# Patient Record
Sex: MALE | Race: WHITE
[De-identification: names, ages, dates, MRNs, and addresses within clinical notes are randomized per-mention and may not be internally consistent; named-entity substitution may affect disease eponyms.]

---

## 2018-10-06 ENCOUNTER — HOSPITAL ENCOUNTER (INPATIENT)
Dept: HOSPITAL 80 - FED | Age: 43
LOS: 3 days | Discharge: HOME | DRG: 287 | End: 2018-10-09
Attending: INTERNAL MEDICINE | Admitting: INTERNAL MEDICINE
Payer: COMMERCIAL

## 2018-10-06 DIAGNOSIS — E86.9: ICD-10-CM

## 2018-10-06 DIAGNOSIS — I51.4: Primary | ICD-10-CM

## 2018-10-06 LAB
INR PPP: 1.08 (ref 0.83–1.16)
PLATELET # BLD: 270 10^3/UL (ref 150–400)
PROTHROMBIN TIME: 14.2 SEC (ref 12–15)

## 2018-10-06 PROCEDURE — B2151ZZ FLUOROSCOPY OF LEFT HEART USING LOW OSMOLAR CONTRAST: ICD-10-PCS | Performed by: INTERNAL MEDICINE

## 2018-10-06 PROCEDURE — B2111ZZ FLUOROSCOPY OF MULTIPLE CORONARY ARTERIES USING LOW OSMOLAR CONTRAST: ICD-10-PCS | Performed by: INTERNAL MEDICINE

## 2018-10-06 PROCEDURE — A9585 GADOBUTROL INJECTION: HCPCS

## 2018-10-06 PROCEDURE — C1760 CLOSURE DEV, VASC: HCPCS

## 2018-10-06 RX ADMIN — IBUPROFEN SCH MG: 600 TABLET ORAL at 20:51

## 2018-10-06 RX ADMIN — NITROGLYCERIN PRN MG: 0.4 TABLET SUBLINGUAL at 18:28

## 2018-10-06 RX ADMIN — NITROGLYCERIN PRN MG: 0.4 TABLET SUBLINGUAL at 18:34

## 2018-10-06 NOTE — EDPHY
H & P


Smoking Status: Never smoked


Time Seen by Provider: 10/06/18 18:03


HPI/ROS: 


CHIEF COMPLAINT:  Chest pressure





HISTORY OF PRESENT ILLNESS:  The patient is a 43-year-old male here with chief 

complaint of chest pressure since 2 o'clock this morning.  He reports chest 

pressure that woke him from his sleep around 2:00 a.m and lasted until around 10

:00 a.m.  States the pain self-resolved and then reoccurred around 4 o'clock 

this afternoon.  The chest pain does not radiate.  He has had no diaphoresis.  

He was picking up his daughter from school and driving when the pain 

reoccurred.  He has had no exertional chest pain recently.  He has no cardiac 

history.  He has no history of diabetes, hypertension, dyslipidemia, smoking.  

He has not taken any aspirin today.  Denies any drug allergies.  He has never 

had a stress test.  States he has felt slightly fatigued for the last 5 days 

has had a"bad workouts"and has noticed that his heart rate has been elevated is 

typically slow.  Has no history of DVT chest pain.  He has had no cough.  

States he woke this morning and had a temperature of 101 degrees.  He's had no 

runny nose or sore throat.





REVIEW OF SYSTEMS:


Constitutional:  + fever, no chills.


Eyes:  No discharge.


ENT:  No sore throat.


Cardiovascular:  + chest pain, no palpitations.


Respiratory:  No cough, no shortness of breath.


Gastrointestinal:  No abdominal pain, no vomiting.


Genitourinary:  No hematuria.


Musculoskeletal:  No back pain.


Skin:  No rashes.


Neurological:  No headache. (Medardo Wall)


Physical Exam: 





General Appearance:  Alert and no distress.  No respiratory distress.  Non 

diaphoretic.


Eyes:  Pupils equal and round no injection.


Respiratory:  Chest is nontender, lungs are clear to auscultation.  No 

respiratory distress


Cardiac:  regular rate and rhythm.  No murmur or rub.


Gastrointestinal:  Abdomen is soft and nontender, no masses, bowel sounds 

normal.


Musculoskeletal:  Neck is supple and nontender.  No JVD


Extremities have full range of motion and are nontender.


Skin:  No rashes or lesions.


Neuro:  Alert and oriented


 (Medardo Wall)


Constitutional: 


 Initial Vital Signs











Temperature (C)  36.7 C   10/06/18 17:57


 


Heart Rate  104 H  10/06/18 17:57


 


Respiratory Rate  18   10/06/18 17:57


 


Blood Pressure  124/89 H  10/06/18 17:57


 


O2 Sat (%)  98   10/06/18 17:57








 











O2 Delivery Mode               Room Air














Allergies/Adverse Reactions: 


 





No Known Allergies Allergy (Verified 03/13/16 22:50)


 








Home Medications: 














 Medication  Instructions  Recorded


 


Herbals/Supplements -Info Only 1 ea PO DAILY 10/06/18














Medical Decision Making





- Diagnostics


EKG Interpretation: 





Sinus rhythm with rate of 90. ST depression in V1 and V3.  No ST elevation.  

.   (Medardo Wall)


ED Course/Re-evaluation: 


Please see Dr. Owen's the documentation.  The 43-year-old male here with the 

chest pain that will confirm sleep at 2:00 a.m. This morning.  Presents to the 

ER with recurrence of pressure and EKG shows ST depression here full concerning 

for acute M I. .  Initial troponin is elevated at 13..  I discussed case with 

my supervising physician who called interventional cardiology.  He was given 

325 aspirin.  He remained hemodynamically stable during his stay in the 

emergency room. (Medardo Wall)


Differential Diagnosis: 





Differential diagnosis considered for chest pain including but not limited to 

myocardial ischemia, aortic dissection, pericarditis, pulmonary embolus, chest 

wall pain, pleural inflammation and pulmonary infectious causes. (Lalo Owen)


Other Provider: 





PHYSICIAN DOCUMENTATION:


The patient was evaluated and managed by the Physician Assistant and myself.  I 

have reviewed the chart and agree with the findings and plan of care as 

documented.  In addition, I examined the patient myself at 1810.  History 

confirmed as nontraumatic chest pressure, but also recent subjective fever. 

Physical findings as follows:  Regular rate rhythm without murmur, not 

diaphoretic.





Discussed with Dr. Hough interventionalist at 6:15 p.m.; he reviewed EKG, 

requested at 18:23 call cath lab in which was done by desk tech.


Discussed with his friend Dr. Delacruz cardiologist at the patient's request, 

by phone.





1900: hr 97, 99% sat, 130/78.  CP down to 0.5/10.  Gianluca with patient.





Critical care time spent by me, Dr. Owen, exclusively with the care of this 

patient was 20 minutes, exclusive of PA or NP time and exclusive of separate 

procedures.  The organ system at risk was is cardiovascular and I ordered 

aspirin, nitroglycerin, cardiology consultation to stabilize the patient and 

prevent worsening of the patient's condition. 





I am the secondary supervising physician.


 (Lalo Owen)





- Data Points


Laboratory Results: 


 Laboratory Results





 10/06/18 18:10 





 10/06/18 18:10 





 











  10/06/18 10/06/18 10/06/18





  18:16 18:10 18:10


 


WBC      





    


 


RBC      





    


 


Hgb      





    


 


Hct      





    


 


MCV      





    


 


MCH      





    


 


MCHC      





    


 


RDW      





    


 


Plt Count      





    


 


MPV      





    


 


Neut % (Auto)      





    


 


Lymph % (Auto)      





    


 


Mono % (Auto)      





    


 


Eos % (Auto)      





    


 


Baso % (Auto)      





    


 


Nucleat RBC Rel Count      





    


 


Absolute Neuts (auto)      





    


 


Absolute Lymphs (auto)      





    


 


Absolute Monos (auto)      





    


 


Absolute Eos (auto)      





    


 


Absolute Basos (auto)      





    


 


Absolute Nucleated RBC      





    


 


Immature Gran %      





    


 


Immature Gran #      





    


 


PT      14.2 SEC SEC





     (12.0-15.0) 


 


INR      1.08 





     (0.83-1.16) 


 


APTT      26.3 SEC SEC





     (23.0-38.0) 


 


Sodium      





    


 


Potassium      





    


 


Chloride      





    


 


Carbon Dioxide      





    


 


Anion Gap      





    


 


BUN      





    


 


Creatinine      





    


 


Estimated GFR      





    


 


Glucose      





    


 


Calcium      





    


 


POC Troponin I  13.09 ng/mL H ng/mL    





   (0.00-0.08)   


 


Troponin I    12.400 ng/mL H ng/mL  





    (0.000-0.034)  














  10/06/18 10/06/18





  18:10 18:10


 


WBC    8.59 10^3/uL 10^3/uL





    (3.80-9.50) 


 


RBC    5.10 10^6/uL 10^6/uL





    (4.40-6.38) 


 


Hgb    15.3 g/dL g/dL





    (13.7-17.5) 


 


Hct    44.0 % %





    (40.0-51.0) 


 


MCV    86.3 fL fL





    (81.5-99.8) 


 


MCH    30.0 pg pg





    (27.9-34.1) 


 


MCHC    34.8 g/dL g/dL





    (32.4-36.7) 


 


RDW    12.1 % %





    (11.5-15.2) 


 


Plt Count    270 10^3/uL 10^3/uL





    (150-400) 


 


MPV    10.1 fL fL





    (8.7-11.7) 


 


Neut % (Auto)    77.3 % H %





    (39.3-74.2) 


 


Lymph % (Auto)    11.8 % L %





    (15.0-45.0) 


 


Mono % (Auto)    10.0 % %





    (4.5-13.0) 


 


Eos % (Auto)    0.5 % L %





    (0.6-7.6) 


 


Baso % (Auto)    0.2 % L %





    (0.3-1.7) 


 


Nucleat RBC Rel Count    0.0 % %





    (0.0-0.2) 


 


Absolute Neuts (auto)    6.64 10^3/uL H 10^3/uL





    (1.70-6.50) 


 


Absolute Lymphs (auto)    1.01 10^3/uL 10^3/uL





    (1.00-3.00) 


 


Absolute Monos (auto)    0.86 10^3/uL H 10^3/uL





    (0.30-0.80) 


 


Absolute Eos (auto)    0.04 10^3/uL 10^3/uL





    (0.03-0.40) 


 


Absolute Basos (auto)    0.02 10^3/uL 10^3/uL





    (0.02-0.10) 


 


Absolute Nucleated RBC    0.00 10^3/uL 10^3/uL





    (0-0.01) 


 


Immature Gran %    0.2 % %





    (0.0-1.1) 


 


Immature Gran #    0.02 10^3/uL 10^3/uL





    (0.00-0.10) 


 


PT    





   


 


INR    





   


 


APTT    





   


 


Sodium  141 mEq/L mEq/L  





   (135-145)  


 


Potassium  3.5 mEq/L mEq/L  





   (3.3-5.0)  


 


Chloride  102 mEq/L mEq/L  





   ()  


 


Carbon Dioxide  25 mEq/l mEq/l  





   (22-31)  


 


Anion Gap  14 mEq/L mEq/L  





   (8-16)  


 


BUN  22 mg/dL mg/dL  





   (7-23)  


 


Creatinine  0.8 mg/dL mg/dL  





   (0.7-1.3)  


 


Estimated GFR  > 60   





   


 


Glucose  157 mg/dL H mg/dL  





   ()  


 


Calcium  10.2 mg/dL mg/dL  





   (8.5-10.4)  


 


POC Troponin I    





   


 


Troponin I    





   











Medications Given: 


 





Ibuprofen (Motrin)  600 mg PO TID KENZIE


   Stop: 04/04/19 21:59


   Last Admin: 10/06/18 20:51 Dose:  600 mg


Nitroglycerin (Nitrostat)  0.4 mg SL Q5M PRN


   PRN Reason: Chest Pain


   Last Admin: 10/06/18 18:34 Dose:  0.4 mg





Discontinued Medications





Aspirin (Aspirin)  325 mg PO EDNOW ONE


   Stop: 10/06/18 18:14


   Last Admin: 10/06/18 18:26 Dose:  325 mg


Nitroglycerin/Dextrose (Nitroglycerin 200 Mcg/Ml (Premix))  250 mls @ 0 mls/hr 

IV EDNOW ONE; Titrate


   PRN Reason: Protocol


   Stop: 10/06/18 18:25


   Last Admin: 10/06/18 18:53 Dose:  250 mls


Sodium Chloride (Ns)  1,000 mls @ 0 mls/hr IV EDNOW ONE; Wide Open


   PRN Reason: Protocol


   Stop: 10/06/18 18:25


   Last Admin: 10/06/18 18:31 Dose:  1,000 mls





Point of Care Test Results: 


 Chemistry











  10/06/18





  18:16


 


POC Troponin I  13.09 ng/mL H ng/mL





   (0.00-0.08) 














Departure





- Departure


Disposition: To OP Cath/Surgery


Clinical Impression: 


 Acute coronary syndrome





Condition: Serious

## 2018-10-06 NOTE — CPEKG
Test Reason : OPEN

Blood Pressure : ***/*** mmHG

Vent. Rate : 090 BPM     Atrial Rate : 091 BPM

   P-R Int : 155 ms          QRS Dur : 095 ms

    QT Int : 352 ms       P-R-T Axes : 065 092 040 degrees

   QTc Int : 431 ms

 

Sinus rhythm

Lateral infarct, acute (LAD)

 

Confirmed by Lalo Owen ([id number]) on 10/6/2018 6:15:24 PM

 

Referred By:             Confirmed By:Lalo Owen

## 2018-10-06 NOTE — ECHO
[email or web address]





[address] [address] 

Main: [phone] 



Fax: 



Transthoracic Echocardiogram 

Name:             GIANNA GAMEZ                       MR#:

[id number]

Study Date:       10/06/2018                           Study Time:

08:04 PM

YOB: 1975                           Age:

43 year(s)

Height:           172.7 cm (68 in.)                    Weight:

79.38 kg (175 lb.)

BSA:              1.93 m2                              Gender:

Male

Examination:      Limited Echo                         Indication:

Eval for LV function and pericardial effusion

Image Quality:    Adequate                             Contrast: 

Requested by:     Angel Kumar                          BP:

/

Heart Rate:                                            Rhythm: 

Indication:       Eval for LV function and pericardial effusion 



Procedure Staff 

Ultrasound Technician:   Juana Ruff [facility] 

Reading Physician:       Angel Kumar MD 

Requesting Provider: 



Conclusions:          Normal size left ventricle.  

Normal global systolic LV function.  

The ejection fraction is visually estimated to be 60 %.  

No pericardial effusion. 



Measurements: 

Chambers                   Valvular Assessment AV/MV          Valvular

Assessment TV/PV



Normal                                  Normal

Normal

Name         Value    Range             Name        Value Range

Name          Value Range

Visual EF:   60 %                           AV Vmax:    1.31 m/s (1

m/s-1.7



m/s)  

AV maxP mmHg ( - )  

AV meanP mmHg ( - )  



Continued Measurements: 



Findings:             Left Ventricle: 

Normal size left ventricle. Normal global systolic LV function. The

ejection fraction is visually

estimated to be 60 %. No regional wall motion abnormality.  

Right Ventricle: 

Normal size right ventricle. Normal RV function.  

Left Atrium: 

The left atrium is normal in size.  

Right Atrium: 

The right atrium is normal in size.  

Mitral Valve: 

The mitral valve is normal in appearance and function. Mild mitral

valve regurgitation is present. No

mitral stenosis is present.  



Patient: GIANNA GAMEZ                     MRN: [id number]

Study Date: 10/06/2018   Page 1 of 2

08:04 PM 









Aortic Valve: 

The aortic valve is tri-leaflet. There is no significant aortic valve

regurgitation. No aortic valve

stenosis is present.  

Tricuspid Valve: 

The tricuspid valve is normal in appearance and function. Trivial

tricuspid valve regurgitation.

Pulmonic Valve: 

The pulmonic valve is normal in appearance and function.  

Pericardium: 

No pericardial effusion. 







Electronically signed by Angel Kumar MD on 10/06/2018 at 08:48 PM 

(No Signature Object) 



Patient: GIANNA GAMEZ                     MRN: [id number]

Study Date: 10/06/2018   Page 2 of 2

08:04 PM 







[id number]

## 2018-10-07 RX ADMIN — NITROGLYCERIN PRN MG: 0.4 TABLET SUBLINGUAL at 09:59

## 2018-10-07 RX ADMIN — CARVEDILOL SCH MG: 3.12 TABLET, FILM COATED ORAL at 11:14

## 2018-10-07 RX ADMIN — OXYCODONE HYDROCHLORIDE AND ACETAMINOPHEN PRN TAB: 5; 325 TABLET ORAL at 06:29

## 2018-10-07 RX ADMIN — OXYCODONE HYDROCHLORIDE AND ACETAMINOPHEN PRN TAB: 5; 325 TABLET ORAL at 08:14

## 2018-10-07 RX ADMIN — IBUPROFEN SCH MG: 600 TABLET ORAL at 21:40

## 2018-10-07 RX ADMIN — CARVEDILOL SCH MG: 3.12 TABLET, FILM COATED ORAL at 19:32

## 2018-10-07 RX ADMIN — IBUPROFEN SCH MG: 600 TABLET ORAL at 16:31

## 2018-10-07 RX ADMIN — PANTOPRAZOLE SODIUM SCH MG: 40 TABLET, DELAYED RELEASE ORAL at 11:02

## 2018-10-07 RX ADMIN — IBUPROFEN SCH MG: 600 TABLET ORAL at 06:05

## 2018-10-07 NOTE — CPEKG
Test Reason : OPEN

Blood Pressure : ***/*** mmHG

Vent. Rate : 078 BPM     Atrial Rate : 078 BPM

   P-R Int : 152 ms          QRS Dur : 095 ms

    QT Int : 360 ms       P-R-T Axes : 061 089 046 degrees

   QTc Int : 411 ms

 

Sinus rhythm

Lateral infarct, acute STEMI

Minimal ST elevation, inferior leads

Compared with 10/6/2018 similar findings

 

Confirmed by Raven Plummer ([id number]) on 10/7/2018 6:57:27 PM

 

Referred By:             Confirmed By:Raven Plummer

## 2018-10-07 NOTE — GCON
PULMONARY CRITICAL CARE CONSULTATION



DATE OF CONSULTATION:  10/07/2018



REASON FOR CONSULTATION:  Chest pain, myocarditis.



HISTORY:  Patient is a healthy 43-year-old gentleman who has noticed some shortness of breath and fat
igue over the last week or so.  Heart rate was high in a recent workout.  He has had 2 days of off an
d on atypical chest pain.  He presented to the emergency department where there was ST-segment abnorm
alities and an elevated troponin at 13.  He was given aspirin and nitroglycerin.  Cardiology consulte
d and took him to the cath lab.  There were was no evidence of coronary artery disease.  Left ventric
ular ejection fraction was 55%.  Cardiac echo was normal.  Repeat troponin showed continued elevation
 at 16. 



After the procedure, the patient was admitted to the intensive care unit for observation and monitori
ng.  He has done well.  Vital signs have been stable.  He has had some intermittent chest pain, which
 has waxed and waned.  No pericardial rub has been heard and no pericardial effusion is present. 



The patient has no other significant problems.  He last traveled to [place] by plane about 3 weeks ago
.  He has had no significant lower extremity symptoms.  CT angiogram of the chest done this morning i
s negative for thromboembolic disease.



PAST MEDICAL HISTORY:  Negative.  He has no medical problems.



MEDICATIONS:  Takes no medications.



DRUG ALLERGIES:  None known.



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
oys.  There is no history of tobacco use.  Significant alcohol is negative.



FAMILY HISTORY:  Positive for coronary artery disease.



REVIEW OF SYSTEMS:  10-point review of systems is negative, except as mentioned above.  He did have a
 low-grade fever 101 the night prior to admission.



PHYSICAL EXAMINATION:  GENERAL:  Reveals a pleasant gentleman who is lying comfortably in bed.  He is
 in no distress.  VITAL SIGNS:  Blood pressure is 138/72, heart rate 85 with sinus rhythm on the simba
tor, respiratory rate is 14.  He is afebrile.  NECK:  Unremarkable for lymphadenopathy or thyromegaly
.  There is no jugular venous distention.  CHEST:  Clear bilaterally.  Excursions are excellent.  The
re are no abnormal sounds.  HEART:  Regular in rate and rhythm.  There are no murmurs, no obvious gal
lops.  P2 is normal.  ABDOMEN:  Soft, nontender.  Bowel sounds are present.  EXTREMITIES:  Unremarkab
le for edema, cords, or tenderness.  NEUROLOGIC:  Examination is within normal limits.



DATABASE:  Catheterization, echo, and CT angiogram are as noted above. 



White blood cell count is 8600, hematocrit 44, platelets 270,000.  PT and PTT were normal on admissio
n.  Basic metabolic panel was normal with the exception of a mildly elevated glucose of 157.  Initial
 troponin was 12.4.  Current troponin this morning is 16.3.  Cholesterol is 94.  BNP is mildly elevat
ed at 973.



ASSESSMENT:  

1.  Chest pain.  This is atypical, likely was related to myocarditis.  The patient is being followed 
by Dr. Hough.  Anti-inflammatory treatment and pain control has been initiated.  He has been placed 
on carvedilol.  There is no evidence of pulmonary embolic disease, pleurisy, or other explanations fo
r his pain.  The etiology for his myocarditis would most likely be viral.

2.  Prophylaxis:  He will be placed on pantoprazole.  He is going to be ambulatory and is at low risk
 for deep venous thrombosis.  Sequential compression devices will be used during bedrest.



PLAN/RECOMMENDATIONS:  Patient will be kept in the intensive care unit.  Telemetry monitoring will be
 maintained.  Appropriate pain control will be given.  Ibuprofen will be continued. 



Further plans and recommendations will be made based on his progress over the next 12-24 hours.





Job #:  [id number]

## 2018-10-07 NOTE — GCON
DATE OF CONSULTATION:  10/06/2018



We have been asked by Dr. Owen to evaluate the patient with symptoms of chest pain.



HISTORY OF PRESENT ILLNESS:  The patient is a 43-year-old gentleman with limited cardiac risk factors
, who presents with a chief complaint of chest pain.  The patient was in his usual state of health un
til approximately 2 o'clock this morning when he woke up experiencing chest pain.  The chest pain was
 described as an intense pressure in the center of his chest without radiation.  The chest pain was a
ssociated with symptoms of nausea and vomiting, but not diaphoresis.  The chest pain lasted until haim
roximately 10 or 11 o'clock this morning when it gradually subsided.  The patient did well until appr
oximately 4 o'clock this afternoon when he had a recurrence of his chest pain.  He took some Gas-X wi
thout resolution of his symptoms.  Therefore, he decided to go to the emergency department for furthe
r evaluation.  In the emergency department, he had an EKG performed demonstrating sinus rhythm.  The 
patient was noted to have subtle ST-segment elevation in leads 1 and aVL with ST-segment depression i
n lead V1.  His troponin was elevated at 13.  We were consulted to help in the further management of 
this patient.  The patient was treated with aspirin and nitroglycerin.  His symptoms had improved sig
nificantly by my arrival at bedside however, patient was continuing to have low level chest pain.  Th
e patient denies a previous history of coronary artery disease.  There is no history of hypertension,
 hyperlipidemia, diabetes mellitus, or smoking.  The patient does report a family history of coronary
 artery disease at an early age onset in his grandfather.  Other symptoms of note:  Approximately 1 d
ay prior to admission, patient noted an increase in his resting heart rate by approximately 20 beats 
per minute.  In addition, patient has been feeling symptoms of generalized fatigue and the evening pr
ior to admission, patient did note a fever of 101 degrees.



PAST MEDICAL HISTORY:  None.



MEDICATIONS:  None.



ALLERGIES:  No known drug allergies.



SOCIAL HISTORY:  Patient is .  He lives with his wife.  He works as a .  He
 does not smoke.  He denies problems with alcohol.



FAMILY HISTORY:  Notable for coronary artery disease.



REVIEW OF SYSTEMS:  10-point review of systems is negative, except as noted in HPI.



PHYSICAL EXAMINATION:  GENERAL:  The patient is resting in bed.  He appears to be in mild distress.  
VITAL SIGNS:  Temperature is afebrile.  Pulse is 85, blood pressure is 128/75, respiratory rate is 18
, SaO2 is 100% on 2 L nasal cannula.  HEENT:  Normocephalic atraumatic.  Extraocular muscles intact. 
 NECK:  No JVD.  No bruits.  LUNGS:  Clear to auscultation bilaterally.  CARDIOVASCULAR:  Regular rat
e and rhythm.  S1, S2.  No murmurs, rubs, or gallops appreciated.  Abdomen.  ABDOMEN:  Soft, nontende
r.  Normoactive bowel sounds.  No hepatosplenomegaly noted.  EXTREMITIES:  No clubbing, cyanosis, or 
edema.  SKIN:  No evidence of rash.  NEURO:  Patient is awake, alert, and oriented x3.



LABORATORY:  White blood cell count is 8.59, hemoglobin 15.3, hematocrit is 44.0, platelet count is 2
70.  Sodium 141, potassium 3.5, chloride 102, CO2 25, BUN 22, creatinine 0.8.  Troponin is 13.09.  Gl
ucose is elevated at 157.  INR is 1.08.  EKG demonstrates sinus rhythm.  Vertical axis normal interva
ls.  Subtle ST-segment elevation in leads 1 and aVL with small R-waves, ST-segment depression in lead
 V1.



ASSESSMENT AND PLAN:  The patient is a 43-year-old gentleman with limited cardiac risk factors, who p
resents with a chest pain syndrome concerning for an anginal equivalent.  His troponin is elevated at
 13.  His EKG is suggestive of possible inferolateral myocardial infarction.  Reviewed risks and bene
fits of cardiac catheterization to further evaluate his symptoms.  Will arrange to have this performe
d emergently.  Other potential etiologies would be pericarditis given his fever and recent flu-like i
llness.  However, his EKG is more suggestive of ischemia than it would be pericarditis.





Job #:  [id number]

## 2018-10-07 NOTE — PDMN
Medical Necessity


Medical necessity: MCG M230 MI-  2 days   subtle ST elevation in leads 1 and a 

VL with sm R waves, ST segment depression in lead V1  - trop elevated 13, 

emergent cardiac cath,

## 2018-10-07 NOTE — CPIP
DATE OF PROCEDURE:  10/06/2018



PROCEDURE:  

1.  Coronary angiography. 

2.  Left ventriculography.



INDICATION:  Acute coronary syndrome with elevated troponin.



ACCESS:  Patient was prepped and draped in sterile fashion.  1% lidocaine was used to anesthetize the
 right inguinal region.  A 6-Iranian introducer sheath was placed selectively into the right common fe
moral artery via modified Seldinger technique.



CORONARY ANGIOGRAPHY:  A 6-Iranian JL4 was advanced to the left main coronary artery and images obtain
ed.  The left main coronary artery bifurcated into an LAD and circumflex coronary arteries.  The left
 main coronary artery appeared normal.  The left anterior descending coronary artery gave rise to 4 d
iagonal branches.  The left anterior descending coronary artery and its complement of diagonal branch
es appeared normal.  The circumflex coronary artery is a large vessel, but was nondominant.  Circumfl
ex coronary artery gave rise to 1 prominent OM branch.  The circumflex coronary artery appeared olena
l.  Its OM branch also appeared normal.  A 6-Iranian JR4 was advanced to the right coronary artery.  I
t did not engage the right coronary artery well.  The 6-Iranian JR4 was exchanged for a 6-Iranian no-to
rque right catheter.  The 6-Iranian no-torque right catheter engaged the right coronary artery well an
d images were obtained.  The right coronary artery is dominant.  The right coronary artery appeared n
ormal.



LEFT VENTRICULOGRAPHY:  A 6-Iranian pigtail catheter was advanced in the left ventricle and images obt
ained.  Left ventricle is normal size, had normal systolic function.  The estimated ejection fraction
 is 55%.  There appeared to be subtle anterolateral hypokinesis.



COMPLICATIONS:  None.



CONCLUSIONS:  

1.  Normal coronary arteries.

2.  Normal left ventricular size and systolic function.





Job #:  [id number]

## 2018-10-07 NOTE — ASMTCMCOM
CM Note

 

CM Note                       

Notes:

Reviewed chart. Pt presented to the Emergency Department with atypical chest pressure. No 

significant history noted. Pt is , lives with his spouse independently. He has two children 

and works as a . Pt admitted for further evaluation and treatment of possible 

ACS; he is s/p a clean heart cath. Discharge needs remain unclear. Pt is being treated for 

myocarditis of unknown etiology. Anticipate pt will likely return home independently when medically 


stable. CM will continue to follow for any potential needs.



Discharge Plan: To be determined, likely independent

 

Date Signed:  10/07/2018 12:52 PM

Electronically Signed By:Kelin Saldaña RN

## 2018-10-07 NOTE — SOAPPROG
SOAP Progress Note


Assessment/Plan: 


1. Myocarditis - Pt presented with symptoms of chest pain and an elevated 

troponin.  Cardiac catheterization on 10/6/18 demonstrated no significant 

obstructive disease, preserved LV function, and an elevated LVEDP of 20 mmHg.  

EKG with subtle ST elevation and LA depression.  Pt has continued to have 

intermittent episodes of chest pain.  Troponin has increased from 12 to 16.  

ESR and CRP are wnl.  Telemetry with short runs of VT from 2 different 

locations.  No CHF.


--> Repeat troponin in am


--> Repeat echocardiogram in am


--> Follow on telemetry


--> motrin 600 mg bid for myoparicarditis


--> coreg 3.125 mg bid





2. Elevated glucose - + family history of DM.


--> Await Hgb A1c


Subjective: 


+ chest pain overnight and again this am


No orthopnea or PND


No access site complications


Objective: 





 Vital Signs











Temp Pulse Resp BP Pulse Ox


 


 36.8 C   72   14   122/76 H  96 


 


 10/07/18 12:00  10/07/18 12:00  10/07/18 12:00  10/07/18 12:00  10/07/18 12:00








 Laboratory Results





 10/07/18 11:28 





 10/07/18 05:30 





 











 10/06/18 10/07/18 10/08/18





 05:59 05:59 05:59


 


Intake Total  1600 


 


Output Total  1050 


 


Balance  550 








 











PT  14.2 SEC (12.0-15.0)   10/06/18  18:10    


 


INR  1.08  (0.83-1.16)   10/06/18  18:10    














Physical Exam





- Physical Exam


General Appearance: alert, no apparent distress


Respiratory: lungs clear


Cardiac/Chest: regular rate, rhythm, No friction rub


Abdomen: non-tender, soft


Extremities: No pedal edema


Neuro/Psych: alert, oriented x 3





ICD10 Worksheet


Patient Problems: 


 Problems











Problem Status Onset


 


Acute coronary syndrome Acute  


 


TIA (transient ischemic attack) Acute

## 2018-10-08 LAB
INR PPP: 1.11 (ref 0.83–1.16)
PLATELET # BLD: 195 10^3/UL (ref 150–400)
PROTHROMBIN TIME: 14.5 SEC (ref 12–15)

## 2018-10-08 RX ADMIN — CARVEDILOL SCH MG: 3.12 TABLET, FILM COATED ORAL at 16:42

## 2018-10-08 RX ADMIN — COLCHICINE SCH MG: 0.6 CAPSULE ORAL at 21:05

## 2018-10-08 RX ADMIN — CARVEDILOL SCH MG: 3.12 TABLET, FILM COATED ORAL at 09:34

## 2018-10-08 RX ADMIN — PANTOPRAZOLE SODIUM SCH MG: 40 TABLET, DELAYED RELEASE ORAL at 09:35

## 2018-10-08 RX ADMIN — IBUPROFEN SCH MG: 600 TABLET ORAL at 21:05

## 2018-10-08 RX ADMIN — COLCHICINE SCH MG: 0.6 CAPSULE ORAL at 11:32

## 2018-10-08 RX ADMIN — IBUPROFEN SCH MG: 600 TABLET ORAL at 09:34

## 2018-10-08 RX ADMIN — IBUPROFEN SCH MG: 600 TABLET ORAL at 16:42

## 2018-10-08 NOTE — ECHO
[email or web address]





[place] [id number] 

Main: [phone] 



Fax: 



Transthoracic Echocardiogram 

Name:            GIANNA GAMEZ                       MR#:

[id number]

Study Date:      10/08/2018                           Study Time:

10:40 AM

YOB: 1975                           Age:

43 year(s)

Height:          175.3 cm (69 in.)                    Weight:

83.01 kg (183 lb.)

BSA:             1.99 m2                              Gender:

Male

Examination:     Limited Echo                         Indication:

reassessment of wall motion and systolic



function 

Image Quality:   Adequate                             Contrast: 

Requested by:    Angel Levy                          BP:

115 mmHg/65 mmHg

Heart Rate:                                           Rhythm: 

Indication:      reassessment of wall motion and systolic function 



Procedure Staff 

Ultrasound Technician:   Ama Anaya [facility] 

Reading Physician:       Donavan Adler MD 

Requesting Provider: 



Conclusions:          Normal size left ventricle.  

No LV hypertrophy.  

Low normal left ventricular systolic function.  

EF is 52 %.  

No regional wall motion abnormality.  

Normal RV function.  

Echo free space noted around RV free wall most likely fat pad. 



Measurements: 

Chambers                   Valvular Assessment AV/MV          Valvular

Assessment TV/PV



Normal                                 Normal

Normal

Name         Value    Range             Name        Value Range

Name          Value Range

IVSd (2D):   0.7 cm (0.6 cm-1.1 



cm)   

LVDd (2D):   4.8 cm   (4.2 cm-5.9 



cm)   

LVDs (2D):   3.5 cm   (2.1 cm-4 



cm)   

LVPWd (2D):  0.9 cm   (0.6 cm-1 



cm)   

LVEF (BP):   52 %     (>=55 %)   



Continued Measurements: 



Findings:             Left Ventricle: 

Normal size left ventricle. No LV hypertrophy. Low normal left

ventricular systolic function. EF is 52

%. No regional wall motion abnormality.  

Right Ventricle: 



Patient: GIANNA GAMEZ                     MRN: [id number]

Study Date: 10/08/2018   Page 1 of 2

10:40 AM 









Normal RV function.  

Pericardium: 

Echo free space noted around RV free wall most likely fat pad. 







Electronically signed by Donavan Adler MD on 10/08/2018 at 12:47 PM 

(No Signature Object) 



Patient: GIANNA GAMEZ                     MRN: [id number]

Study Date: 10/08/2018   Page 2 of 2

10:40 AM 







[id number]

## 2018-10-08 NOTE — PDCARPN
Cardiology Progress Note


Chief Complaint: 





Chest pains


Assessment/Plan: 


Assessment:


Patient is a 44 y/o male with unremarkable past medical history (no HTN, HLP, 

CAD, or DM), who presented last week (Friday) with complaints of chest pains. 

ECG with ST elevation and elevation to the troponin was noted.  The patient was 

taken to the cardiac cath lab without appreciable CAD noted.  LVEF by 

ventriculography as well as echocardiography was normal (55% by angiography and 

60% by echo).  Troponin continues to elevate (mild elevation was noted with 

this morning's assessment).  Patient without cardiovascular complaints - this 

is the longest the patient has gone without symptoms.  Telemetry with very 

infrequent PVCs (there was one noted this morning while on rounds).  Patient's 

family is cardiologist with thoughts about addition of colchicine (given subtle 

ST pattern that appears to have come pericardial involvement).  Discussion this 

morning about both CMR and EMB as diagnostic testing options.





Plan:


(1)  Would continue therapy on Coreg as at present


(2)  Add colchicine (0.6 mg twice per day)


(3)  Repeat troponin at noon (first testing was 5 am)


(4)  Will order CMR this morning


(5)  Yesterday's note made mention of repeat echocardiography as well which is 

reasonable, non nuclear, and non invasive


(6)  Many signs and symptoms would continue the patient on the path to a EMB, 

and the patient has been told this 


 - EMB is more sensitive and specific for the diagnosis (specifically)








Subjective: 





No cardiovascular complaints





Objective: 





 Vital Signs (8 Hrs)











  Temp Pulse Resp BP Pulse Ox


 


 10/08/18 09:34   84   117/74 


 


 10/08/18 08:00  37 C  70  21 H  117/74  96


 


 10/08/18 06:00  36.6 C  65  14  98/59 L  96


 


 10/08/18 04:00   68  16  100/55 L 








 Intake/Output (24 Hrs)











 10/07/18 10/08/18 10/09/18





 05:59 05:59 05:59


 


Intake Total 1600 650 


 


Output Total 1050 0 


 


Balance 550 650 


 


Intake:   


 


  Oral (ml) 500 650 


 


  IV Intake (ml) 900  


 


  IV Infused (ml) 200  


 


Output:   


 


  Urine (ml) 1050  


 


    Toilet 800  


 


    Urinal 250  


 


  Emesis (ml)  0 


 


Other:   


 


  Weight 83.2 kg  


 


  Intake Quantity  Yes 





  Sufficient   


 


  Number of Voids 0  


 


    Toilet 2 3 


 


    Urinal 1  


 


  Number of Stools   


 


    Toilet  0 











Result Diagrams: 


 10/08/18 05:42





 10/08/18 05:42


Cardiac Labs: 





 Cardiac Lab Results (72 Hrs)











  10/08/18 10/07/18





  05:42 05:30


 


Troponin I  18.200 H  16.300 H











EKG: 





Sinus rhythm with ST elevation (diffusely)





Telemetry: 





Normal sinus rhythm


Echocardiogram: 





Normal LVEF without yris wall motion abnormalities noted








- Physical Exam


Constitutional: WDWN, healthy appearing, no apparent distress


Eyes: PERRL, EOMI


Ears, Nose, Mouth, Throat: moist mucous membranes


Cardiovascular: regular rate and rhythm, no murmurs, no rubs, no gallops, 

pulses symmetric bilat, No jugular vein distention


Peripheral Pulses: 2+: dorsalis-pedis (R), dorsalis-pedis (L)


Respiratory: clear to auscultate bilat, no crackles, no wheezes


Gastrointestinal: normoactive bowel sounds


Skin: no rashes, no edema


Musculoskeletal: no muscular tenderness


Neurologic: AAOx3, CN II-XII grossly intact


Psychiatric: cooperative, interactive, following commands





ICD10 Worksheet


Patient Problems: 


 Problems











Problem Status Onset


 


Acute coronary syndrome Acute  


 


TIA (transient ischemic attack) Acute

## 2018-10-08 NOTE — PDINTPN
Intensivist Progress Note


Assessment/Plan: 


Assessment:


Probable Myocarditis: Symptoms resolved, resting HR down last troponin down, 

and EF mildly reduced but stable on Echo this AM. 





Plan: Cardiac MRI. Follow troponins. Depending on MRI and troponins, will 

either continue to follow or consider endomyocardial biopsy.








10/08/18 16:56





Subjective: 





Feels well, no CP, palpitations, dyspnea.


Objective: 





 Vital Signs











Temp Pulse Resp BP Pulse Ox


 


 37 C   81   12   120/73   97 


 


 10/08/18 16:00  10/08/18 16:42  10/08/18 16:00  10/08/18 16:42  10/08/18 16:00








 Laboratory Results





 10/08/18 05:42 





 10/08/18 05:42 





 











 10/07/18 10/08/18 10/09/18





 05:59 05:59 05:59


 


Intake Total 1600 650 600


 


Output Total 1050 0 


 


Balance 550 650 600








 











PT  14.5 SEC (12.0-15.0)   10/08/18  05:42    


 


INR  1.11  (0.83-1.16)   10/08/18  05:42    








Echo: EF 52%.


 Laboratory Tests











  10/06/18 10/07/18 10/08/18





  18:10 05:30 05:42


 


Troponin I  12.400 H  16.300 H  18.200 H














  10/08/18





  11:25


 


Troponin I  9.680 H














Physical Exam





- Physical Exam


General Appearance: alert, no apparent distress


EENT: normal ENT inspection


Neck: normal inspection


Respiratory: lungs clear, normal breath sounds, No respiratory distress


Cardiac/Chest: regular rate, rhythm, No edema, No gallop


Abdomen: normal bowel sounds, non-tender


Skin: normal color, warm/dry


Extremities: normal inspection


Neuro/Psych: alert, normal mood/affect, oriented x 3





ICD10 Worksheet


Patient Problems: 


 Problems











Problem Status Onset


 


Acute coronary syndrome Acute  


 


TIA (transient ischemic attack) Acute

## 2018-10-09 VITALS — SYSTOLIC BLOOD PRESSURE: 143 MMHG | DIASTOLIC BLOOD PRESSURE: 89 MMHG

## 2018-10-09 RX ADMIN — CARVEDILOL SCH MG: 3.12 TABLET, FILM COATED ORAL at 09:46

## 2018-10-09 RX ADMIN — PANTOPRAZOLE SODIUM SCH MG: 40 TABLET, DELAYED RELEASE ORAL at 09:46

## 2018-10-09 RX ADMIN — COLCHICINE SCH MG: 0.6 CAPSULE ORAL at 09:45

## 2018-10-09 RX ADMIN — IBUPROFEN SCH MG: 600 TABLET ORAL at 09:45

## 2018-10-09 NOTE — GDS
INDICATION FOR ADMISSION:  Chest pain.



DISCHARGE DIAGNOSIS:  Myocarditis.



HOSPITAL COURSE:  The patient is a pleasant 43-year-old healthy, physically fit gentleman with no sig
nificant past medical history, who presented to [facility] Emergency Department on th
e evening of October 6, 2018, at approximately 1800.  He presented with complaints of substernal ches
t pain and pressure.  Initial ECG demonstrated ST-segment elevation in leads I and aVL with ST depres
rico in lead V1.  Troponin on admission of 12.4.  Cardiology was consulted and he was brought directl
y to the cardiac catheterization lab, where he underwent diagnostic left heart catheterization.  Card
iac catheterization demonstrated normal coronary arteries.  Echocardiogram performed post-procedure d
emonstrated normal left ventricular function with no evidence of pericardial effusion.  His troponin 
continued to elevate throughout the initial course of his hospitalization to a peak troponin of 18.2.
  He did have brief runs of nonsustained ventricular tachycardia that were asymptomatic. 



In the setting of rising troponin, nonsustained ventricular tachycardia, a repeat echocardiogram was 
performed demonstrating continued normal left ventricular function with LVEF of 52%.  There was no ev
idence of pericardial effusion.  Attempt to perform cardiac MRI was aborted secondary to claustrophob
ia.  He was started on low-dose Coreg.  Over the last 48 hours, he has had no further ventricular ect
opy.  His troponin has continued to trend down and is currently 8.39 at the time of his discharge. 



At the time of his discharge, he was feeling well.  He has no cardiac complaints.  He denies chest pa
in, chest pressure, or shortness of breath.



PHYSICAL EXAMINATION:  GENERAL:  He is awake, alert, oriented, appropriate.  No apparent distress.  N
JAMES:  There is no evidence of JVP or carotid bruits.  LUNGS:  Clear to auscultation bilaterally.  CAR
DIAC:  S1, S2.  Regular rate and rhythm.  No murmurs, rubs, or gallops.  His PMI is not displaced.  A
BDOMEN:  Soft, nontender, nondistended.  Right groin site is stable without hematoma or ecchymosis.  
Distal pulses are intact.  There is no evidence of lower extremity edema.  VITAL SIGNS:  At time of d
ischarge, blood pressure 143/89, heart rate 69 and sinus rhythm, temperature 36.2.



LAB WORK:  At time of discharge:  N-terminal proBNP 475.  Hemoglobin of 13.5, hematocrit of 36.8, whi
te blood cell count 8.1, platelets 195.  Magnesium 1.8, potassium 4.0, creatinine 0.7.  Troponin curr
ently 8.39.



MEDICATIONS:  Cardiac medications at time of discharge:  

1.  Colchicine 0.6 mg p.o. b.i.d.

2.  Metoprolol succinate 50 mg once daily.

3.  Ibuprofen 400 mg p.o. t.i.d. x7 days.



PROCEDURES DURING HOSPITALIZATION:  

1.  Initial ECG demonstrated sinus rhythm with ST elevation in I and aVL with ST depression in V1.  F
ollowup ECG was unchanged.

2.  Initial echocardiogram on October 6, 2018, demonstrates normal left ventricular function, with no
 evidence of pericardial effusion.  Repeat echocardiogram on October 8, 2018, demonstrates normal lef
t ventricular function with LVEF of 52%, with no evidence of pericardial effusion.



PLAN:  At time of discharge: 

1.  Patient will be discharged home. 

2.  Patient will be discharged on colchicine, metoprolol succinate, and ibuprofen. 

3.  Patient is scheduled to follow up in our office with Marietta Galicia, certified nurse yoshi mcmillan, on Wednesday, October 17, 2018 at 10:45 a.m. 



Patient has been given post left heart catheterization instructions. 



Patient has been given our contact number and my business card and asked to call with questions or co
sami.





Job #:  [id number]

## 2018-10-09 NOTE — PDINTPN
Intensivist Progress Note


Assessment/Plan: 


Assessment:


Probable Myocarditis: Symptoms resolved, resting HR down, troponins/BNP down, 

and EF mildly reduced but stable on Echo yesterday AM. Unable to complete MRI 

due to claustrophobia.





Plan: Follow troponins. ? further testing per cardiology. Can be tele status.








10/09/18 09:56





Subjective: 





Feels well, no pain, dyspnea, palpatations. Energy is good.


Objective: 





 Vital Signs











Temp Pulse Resp BP Pulse Ox


 


 36.5 C   68   20   109/60   97 


 


 10/09/18 04:00  10/09/18 06:00  10/09/18 06:00  10/09/18 06:00  10/09/18 06:00








 Laboratory Results





 10/08/18 05:42 





 10/09/18 06:34 





 











 10/08/18 10/09/18 10/10/18





 05:59 05:59 05:59


 


Intake Total 650 800 


 


Output Total 0  


 


Balance 650 800 








 











PT  14.5 SEC (12.0-15.0)   10/08/18  05:42    


 


INR  1.11  (0.83-1.16)   10/08/18  05:42    











 Laboratory Tests











  10/09/18





  06:34


 


Troponin I  8.390 H


 


NT-Pro-B Natriuret Pep  475 H














Physical Exam





- Physical Exam


General Appearance: alert, no apparent distress


EENT: normal ENT inspection


Neck: normal inspection


Respiratory: lungs clear, normal breath sounds


Cardiac/Chest: regular rate, rhythm, No edema


Abdomen: normal bowel sounds, non-tender, soft


Skin: normal color, warm/dry


Extremities: normal inspection


Neuro/Psych: alert, normal mood/affect, oriented x 3





ICD10 Worksheet


Patient Problems: 


 Problems











Problem Status Onset


 


Acute coronary syndrome Acute  


 


TIA (transient ischemic attack) Acute